# Patient Record
Sex: MALE | Race: BLACK OR AFRICAN AMERICAN | NOT HISPANIC OR LATINO | Employment: UNEMPLOYED | RURAL
[De-identification: names, ages, dates, MRNs, and addresses within clinical notes are randomized per-mention and may not be internally consistent; named-entity substitution may affect disease eponyms.]

---

## 2023-04-24 ENCOUNTER — OFFICE VISIT (OUTPATIENT)
Dept: FAMILY MEDICINE | Facility: CLINIC | Age: 23
End: 2023-04-24
Payer: COMMERCIAL

## 2023-04-24 VITALS
HEIGHT: 73 IN | HEART RATE: 76 BPM | SYSTOLIC BLOOD PRESSURE: 118 MMHG | DIASTOLIC BLOOD PRESSURE: 53 MMHG | BODY MASS INDEX: 24.12 KG/M2 | OXYGEN SATURATION: 100 % | TEMPERATURE: 97 F | WEIGHT: 182 LBS | RESPIRATION RATE: 16 BRPM

## 2023-04-24 DIAGNOSIS — T78.40XA ALLERGIC REACTION, INITIAL ENCOUNTER: Primary | ICD-10-CM

## 2023-04-24 PROCEDURE — 96372 THER/PROPH/DIAG INJ SC/IM: CPT | Mod: ,,, | Performed by: NURSE PRACTITIONER

## 2023-04-24 PROCEDURE — 1159F MED LIST DOCD IN RCRD: CPT | Mod: CPTII,,, | Performed by: NURSE PRACTITIONER

## 2023-04-24 PROCEDURE — 3074F PR MOST RECENT SYSTOLIC BLOOD PRESSURE < 130 MM HG: ICD-10-PCS | Mod: CPTII,,, | Performed by: NURSE PRACTITIONER

## 2023-04-24 PROCEDURE — 3008F PR BODY MASS INDEX (BMI) DOCUMENTED: ICD-10-PCS | Mod: CPTII,,, | Performed by: NURSE PRACTITIONER

## 2023-04-24 PROCEDURE — 3078F DIAST BP <80 MM HG: CPT | Mod: CPTII,,, | Performed by: NURSE PRACTITIONER

## 2023-04-24 PROCEDURE — 1159F PR MEDICATION LIST DOCUMENTED IN MEDICAL RECORD: ICD-10-PCS | Mod: CPTII,,, | Performed by: NURSE PRACTITIONER

## 2023-04-24 PROCEDURE — 3074F SYST BP LT 130 MM HG: CPT | Mod: CPTII,,, | Performed by: NURSE PRACTITIONER

## 2023-04-24 PROCEDURE — RUSHUWA RUSH UWA STUDENT OFFICE VISIT: ICD-10-PCS | Mod: ,,, | Performed by: NURSE PRACTITIONER

## 2023-04-24 PROCEDURE — 96372 PR INJECTION,THERAP/PROPH/DIAG2ST, IM OR SUBCUT: ICD-10-PCS | Mod: ,,, | Performed by: NURSE PRACTITIONER

## 2023-04-24 PROCEDURE — 1160F RVW MEDS BY RX/DR IN RCRD: CPT | Mod: CPTII,,, | Performed by: NURSE PRACTITIONER

## 2023-04-24 PROCEDURE — 3078F PR MOST RECENT DIASTOLIC BLOOD PRESSURE < 80 MM HG: ICD-10-PCS | Mod: CPTII,,, | Performed by: NURSE PRACTITIONER

## 2023-04-24 PROCEDURE — 1160F PR REVIEW ALL MEDS BY PRESCRIBER/CLIN PHARMACIST DOCUMENTED: ICD-10-PCS | Mod: CPTII,,, | Performed by: NURSE PRACTITIONER

## 2023-04-24 PROCEDURE — 3008F BODY MASS INDEX DOCD: CPT | Mod: CPTII,,, | Performed by: NURSE PRACTITIONER

## 2023-04-24 PROCEDURE — RUSHUWA RUSH UWA STUDENT OFFICE VISIT: Mod: ,,, | Performed by: NURSE PRACTITIONER

## 2023-04-24 RX ORDER — CETIRIZINE HYDROCHLORIDE 10 MG/1
10 TABLET ORAL DAILY
Qty: 30 TABLET | Refills: 11 | Status: SHIPPED | OUTPATIENT
Start: 2023-04-24 | End: 2024-04-23

## 2023-04-24 RX ORDER — FAMOTIDINE 20 MG/1
20 TABLET, FILM COATED ORAL 2 TIMES DAILY
Qty: 60 TABLET | Refills: 11 | Status: SHIPPED | OUTPATIENT
Start: 2023-04-24 | End: 2024-04-23

## 2023-04-24 RX ORDER — BETAMETHASONE SODIUM PHOSPHATE AND BETAMETHASONE ACETATE 3; 3 MG/ML; MG/ML
6 INJECTION, SUSPENSION INTRA-ARTICULAR; INTRALESIONAL; INTRAMUSCULAR; SOFT TISSUE
Status: COMPLETED | OUTPATIENT
Start: 2023-04-24 | End: 2023-04-24

## 2023-04-24 RX ADMIN — BETAMETHASONE SODIUM PHOSPHATE AND BETAMETHASONE ACETATE 6 MG: 3; 3 INJECTION, SUSPENSION INTRA-ARTICULAR; INTRALESIONAL; INTRAMUSCULAR; SOFT TISSUE at 03:04

## 2023-04-24 NOTE — PROGRESS NOTES
"   TRINA Osorio   [facility] [address] [place] [address]  [phone]      PATIENT NAME: Oscar Cotton  : 2000  DATE: 23  MRN: [id number]      Billing Provider: TRINA Osorio  Level of Service:   Patient PCP Information       None on File            Reason for Visit / Chief Complaint: Urticaria       Update PCP  Update Chief Complaint         History of Present Illness / Problem Focused Workflow     Oscar Cotton presents to the clinic with Urticaria     Pt presents with a dermatis likely an allergic reaction to washing soap. He reports he recently used his roommates soap. Discussed this is likely the cause. Encouraged to change back to his usual brand of washing soap.   Will treat with H1/h2 blocker and steroid. Sample of "itch lotion from [employer]" given       Review of Systems     Review of Systems   Constitutional:  Negative for fatigue and fever.   HENT:  Negative for nasal congestion and sore throat.    Eyes:  Negative for visual disturbance.   Respiratory:  Negative for chest tightness and shortness of breath.    Cardiovascular:  Negative for chest pain and leg swelling.   Gastrointestinal:  Negative for abdominal pain, change in bowel habit and change in bowel habit.   Endocrine: Negative for polydipsia, polyphagia and polyuria.   Genitourinary:  Negative for dysuria and hematuria.   Musculoskeletal:  Negative for back pain and leg pain.   Integumentary:  Positive for rash.   Neurological:  Negative for dizziness, syncope, weakness and light-headedness.      Medical / Social / Family History   History reviewed. No pertinent past medical history.    History reviewed. No pertinent surgical history.    Social History  Mr. Cotton  reports that he has never smoked. He has never used smokeless tobacco. He reports that he does not currently use alcohol. He reports current drug use. Drug: " Marijuana.    Family History  Mr. Cotton's family history is not on file.    Medications and Allergies     Medications  No outpatient medications have been marked as taking for the 4/24/23 encounter (Office Visit) with TRINA Osorio.     Current Facility-Administered Medications for the 4/24/23 encounter (Office Visit) with TRINA Osorio   Medication Dose Route Frequency Provider Last Rate Last Admin    [COMPLETED] betamethasone acetate-betamethasone sodium phosphate injection 6 mg  6 mg Intramuscular 1 time in Clinic/HOD TRINA Osorio   6 mg at 04/24/23 1548       Allergies  Review of patient's allergies indicates:  No Known Allergies    Physical Examination     Vitals:    04/24/23 1511   BP: (!) 118/53   Pulse: 76   Resp: 16   Temp: 97.2 °F (36.2 °C)     Physical Exam  Eyes:      Pupils: Pupils are equal, round, and reactive to light.   Cardiovascular:      Rate and Rhythm: Normal rate and regular rhythm.      Heart sounds: No murmur heard.  Pulmonary:      Breath sounds: Normal breath sounds. No wheezing, rhonchi or rales.   Abdominal:      General: Bowel sounds are normal.   Musculoskeletal:         General: No swelling.      Cervical back: Normal range of motion and neck supple.   Skin:     Findings: Rash present. Rash is macular.   Neurological:      Mental Status: He is alert and oriented to person, place, and time.        Assessment and Plan (including Health Maintenance)      Problem List  Smart Sets  Document Outside HM   :    Plan:   1. Allergic reaction, initial encounter  Comments:  dermatitis likely related to washing soap.  treat H1/h2 blockers and steroids today   Orders:  -     betamethasone acetate-betamethasone sodium phosphate injection 6 mg  -     cetirizine (ZYRTEC) 10 MG tablet; Take 1 tablet (10 mg total) by mouth once daily.  Dispense: 30 tablet; Refill: 11  -     famotidine (PEPCID) 20 MG tablet; Take 1 tablet (20 mg total) by mouth 2 (two) times daily.   Dispense: 60 tablet; Refill: 11           Health Maintenance Due   Topic Date Due    Hepatitis C Screening  Never done    Lipid Panel  Never done    COVID-19 Vaccine (1) Never done    HPV Vaccines (1 - Male 2-dose series) Never done    HIV Screening  Never done    TETANUS VACCINE  Never done    Influenza Vaccine (1) Never done       Problem List Items Addressed This Visit    None  Visit Diagnoses       Allergic reaction, initial encounter    -  Primary    dermatitis likely related to washing soap.  treat H1/h2 blockers and steroids today     Relevant Medications    betamethasone acetate-betamethasone sodium phosphate injection 6 mg (Completed)    cetirizine (ZYRTEC) 10 MG tablet    famotidine (PEPCID) 20 MG tablet            The patient has no Health Maintenance topics of status Not Due    No future appointments.         Signature:  TRINA Osorio [facility] [address] [place] [address]  [phone]    Date of encounter: 4/24/23